# Patient Record
Sex: FEMALE | Race: WHITE | NOT HISPANIC OR LATINO | Employment: STUDENT | URBAN - NONMETROPOLITAN AREA
[De-identification: names, ages, dates, MRNs, and addresses within clinical notes are randomized per-mention and may not be internally consistent; named-entity substitution may affect disease eponyms.]

---
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
## 2024-02-01 PROCEDURE — 87637 SARSCOV2&INF A&B&RSV AMP PRB: CPT | Performed by: NURSE PRACTITIONER

## 2024-02-26 ENCOUNTER — APPOINTMENT (OUTPATIENT)
Dept: GENERAL RADIOLOGY | Facility: HOSPITAL | Age: 19
End: 2024-02-26
Payer: COMMERCIAL

## 2024-02-26 PROCEDURE — 71046 X-RAY EXAM CHEST 2 VIEWS: CPT

## 2024-03-18 ENCOUNTER — OFFICE VISIT (OUTPATIENT)
Dept: OBSTETRICS AND GYNECOLOGY | Age: 19
End: 2024-03-18
Payer: COMMERCIAL

## 2024-03-18 VITALS
DIASTOLIC BLOOD PRESSURE: 72 MMHG | SYSTOLIC BLOOD PRESSURE: 108 MMHG | HEIGHT: 66 IN | WEIGHT: 120 LBS | BODY MASS INDEX: 19.29 KG/M2

## 2024-03-18 DIAGNOSIS — Z30.9 ENCOUNTER FOR CONTRACEPTIVE MANAGEMENT, UNSPECIFIED TYPE: Primary | ICD-10-CM

## 2024-03-18 DIAGNOSIS — Z72.0 VAPES NICOTINE CONTAINING SUBSTANCE: ICD-10-CM

## 2024-03-18 NOTE — PROGRESS NOTES
"Chief Complaint  Contraception (Pt is here wanting to discuss birth control options.  Pt c/o her periods being irregular, she will go a month without having one then will start at the first of the next month.  States her periods used to always be regular and at the end of the month. )    Subjective          Angela Villarreal presents to [facility]  History of Present Illness  The patient is an 18-year-old female who presents for evaluation of multiple medical concerns.    She was on birth control pills in the beginning of 11/2023 and stopped at the beginning of 12/2023 or end of 11/2023, but it made her feel depressed.   She does not remember anything else emotionally that could have accentuated how she was feeling.   She denies any increased stress or relationship issues during that timeframe.   Her last period started on 01/05/2024.    Review of Systems   Genitourinary:  Positive for menstrual problem.   Psychiatric/Behavioral:  Negative for self-injury and suicidal ideas.         Mood changes with OCP that have estrogen and progesterone.            Objective   Vital Signs:   /72   Ht 167.6 cm (66\")   Wt 54.4 kg (120 lb)   BMI 19.37 kg/m²     Physical Exam  Neurological:      Mental Status: She is alert.   Psychiatric:         Thought Content: Thought content does not include homicidal or suicidal ideation. Thought content does not include homicidal or suicidal plan.         Result Review :   The following data was reviewed by: LANDEN Jara on 03/18/2024:  OBGYN Diagnostics Review:   HCG, Urine, QL   Date Value Ref Range Status   03/18/2024 Negative Negative Final                 No current outpatient medications on file prior to visit.     No current facility-administered medications on file prior to visit.          Assessment and Plan        We discussed different birth control options including birth control pills, patches, NuvaRing, Depo-Provera injections, " Nexplanon, and IUD.   I will send in a prescription for Slynd.   Instructed pt about beginning Slynd, side effects and S&S to report. Pt voiced understanding.      Follow-up  The patient will follow up in 3 months.    Diagnoses and all orders for this visit:    1. Encounter for contraceptive management, unspecified type (Primary)  -     POC Pregnancy, Urine    2. Vapes nicotine containing substance    Other orders  -     Drospirenone 4 MG tablet; Take 1 tablet by mouth Daily.  Dispense: 84 tablet; Refill: 0          Pediatric BMI = 20 %ile (Z= -0.83) based on CDC (Girls, 2-20 Years) BMI-for-age based on BMI available as of 3/18/2024.. BMI is within normal parameters. No other follow-up for BMI required.       Follow Up   Return in about 3 months (around 6/18/2024) for med check.    Patient was given instructions and counseling regarding her condition or for health maintenance advice. Please see specific information pulled into the AVS if appropriate.       Transcribed from ambient dictation for LANDEN Jara by Gisela Cross.  03/18/24   12:40 CDT    Patient or patient representative verbalized consent to the visit recording.  I have personally performed the services described in this document as transcribed by the above individual, and it is both accurate and complete.  LANDEN Jara  3/31/2024  21:20 CDT

## 2024-04-01 NOTE — PATIENT INSTRUCTIONS

## 2024-08-16 ENCOUNTER — APPOINTMENT (OUTPATIENT)
Dept: CT IMAGING | Age: 19
End: 2024-08-16
Payer: MEDICAID

## 2024-08-16 ENCOUNTER — HOSPITAL ENCOUNTER (EMERGENCY)
Age: 19
Discharge: HOME | End: 2024-08-16
Attending: PEDIATRICS
Payer: MEDICAID

## 2024-08-16 VITALS
DIASTOLIC BLOOD PRESSURE: 84 MMHG | HEART RATE: 97 BPM | OXYGEN SATURATION: 93 % | RESPIRATION RATE: 20 BRPM | BODY MASS INDEX: 19.29 KG/M2 | TEMPERATURE: 98.4 F | HEIGHT: 66 IN | WEIGHT: 120 LBS | SYSTOLIC BLOOD PRESSURE: 121 MMHG

## 2024-08-16 DIAGNOSIS — R51.9 ACUTE NONINTRACTABLE HEADACHE, UNSPECIFIED HEADACHE TYPE: ICD-10-CM

## 2024-08-16 DIAGNOSIS — I88.9 LYMPHADENITIS: Primary | ICD-10-CM

## 2024-08-16 LAB
ANION GAP SERPL CALCULATED.3IONS-SCNC: 11 MMOL/L (ref 7–19)
BASOPHILS # BLD: 0 K/UL (ref 0–0.2)
BASOPHILS NFR BLD: 0.8 % (ref 0–1)
BUN SERPL-MCNC: 11 MG/DL (ref 6–20)
CALCIUM SERPL-MCNC: 9.3 MG/DL (ref 8.6–10)
CHLORIDE SERPL-SCNC: 104 MMOL/L (ref 98–111)
CO2 SERPL-SCNC: 23 MMOL/L (ref 22–29)
CREAT SERPL-MCNC: 0.5 MG/DL (ref 0.5–0.9)
EOSINOPHIL # BLD: 0 K/UL (ref 0–0.6)
EOSINOPHIL NFR BLD: 0.5 % (ref 0–5)
ERYTHROCYTE [DISTWIDTH] IN BLOOD BY AUTOMATED COUNT: 14 % (ref 11.5–14.5)
GLUCOSE SERPL-MCNC: 99 MG/DL (ref 74–109)
HCT VFR BLD AUTO: 38.5 % (ref 37–47)
HGB BLD-MCNC: 12.1 G/DL (ref 12–16)
IMM GRANULOCYTES # BLD: 0 K/UL
LYMPHOCYTES # BLD: 1 K/UL (ref 1.1–4.5)
LYMPHOCYTES NFR BLD: 26.5 % (ref 20–40)
MCH RBC QN AUTO: 26.4 PG (ref 27–31)
MCHC RBC AUTO-ENTMCNC: 31.4 G/DL (ref 33–37)
MCV RBC AUTO: 83.9 FL (ref 81–99)
MONOCYTES # BLD: 0.5 K/UL (ref 0–0.9)
MONOCYTES NFR BLD: 13 % (ref 0–10)
NEUTROPHILS # BLD: 2.3 K/UL (ref 1.5–7.5)
NEUTS SEG NFR BLD: 59.2 % (ref 50–65)
PLATELET # BLD AUTO: 175 K/UL (ref 130–400)
PMV BLD AUTO: 11.4 FL (ref 9.4–12.3)
POTASSIUM SERPL-SCNC: 4.1 MMOL/L (ref 3.5–5)
RBC # BLD AUTO: 4.59 M/UL (ref 4.2–5.4)
SODIUM SERPL-SCNC: 138 MMOL/L (ref 136–145)
WBC # BLD AUTO: 3.9 K/UL (ref 4.8–10.8)

## 2024-08-16 PROCEDURE — 96374 THER/PROPH/DIAG INJ IV PUSH: CPT

## 2024-08-16 PROCEDURE — 80048 BASIC METABOLIC PNL TOTAL CA: CPT

## 2024-08-16 PROCEDURE — 36415 COLL VENOUS BLD VENIPUNCTURE: CPT

## 2024-08-16 PROCEDURE — 6360000002 HC RX W HCPCS: Performed by: PEDIATRICS

## 2024-08-16 PROCEDURE — 99284 EMERGENCY DEPT VISIT MOD MDM: CPT

## 2024-08-16 PROCEDURE — 70450 CT HEAD/BRAIN W/O DYE: CPT

## 2024-08-16 PROCEDURE — 85025 COMPLETE CBC W/AUTO DIFF WBC: CPT

## 2024-08-16 RX ORDER — KETOROLAC TROMETHAMINE 30 MG/ML
15 INJECTION, SOLUTION INTRAMUSCULAR; INTRAVENOUS ONCE
Status: COMPLETED | OUTPATIENT
Start: 2024-08-16 | End: 2024-08-16

## 2024-08-16 RX ORDER — IBUPROFEN 400 MG/1
400 TABLET ORAL EVERY 6 HOURS PRN
Qty: 20 TABLET | Refills: 0 | Status: SHIPPED | OUTPATIENT
Start: 2024-08-16

## 2024-08-16 RX ORDER — KETOROLAC TROMETHAMINE 30 MG/ML
30 INJECTION, SOLUTION INTRAMUSCULAR; INTRAVENOUS ONCE
Status: DISCONTINUED | OUTPATIENT
Start: 2024-08-16 | End: 2024-08-16

## 2024-08-16 RX ADMIN — KETOROLAC TROMETHAMINE 15 MG: 30 INJECTION, SOLUTION INTRAMUSCULAR at 21:15

## 2024-08-19 NOTE — ED PROVIDER NOTES
RADIOLOGY:  Non-plain film images such as CT, Ultrasound and MRI are read by the radiologist. Plain radiographic images are visualized and preliminarily interpreted bythe emergency physician with the below findings:          CT Head W/O Contrast   Final Result       No acute intracranial abnormality.        All CT scans are performed using dose optimization techniques as appropriate to the performed exam and include    at least one of the following: Automated exposure control, adjustment of the mA and/or kV according to size, and the use of iterative reconstruction technique.        ______________________________________    Electronically signed by: REDD JAIME M.D.   Date:     08/16/2024   Time:    21:32               LABS:  Labs Reviewed   CBC WITH AUTO DIFFERENTIAL - Abnormal; Notable for the following components:       Result Value    WBC 3.9 (*)     MCH 26.4 (*)     MCHC 31.4 (*)     Monocytes % 13.0 (*)     Lymphocytes Absolute 1.0 (*)     All other components within normal limits   BASIC METABOLIC PANEL       All other labs were within normal range or not returned as of this dictation.    EMERGENCY DEPARTMENT COURSE and DIFFERENTIAL DIAGNOSIS/MDM:   Vitals:    Vitals:    08/16/24 1857   BP: 121/84   Pulse: 97   Resp: 20   Temp: 98.4 °F (36.9 °C)   TempSrc: Oral   SpO2: 93%   Weight: 54.4 kg (120 lb)   Height: 1.676 m (5' 6\")       MDM     Amount and/or Complexity of Data Reviewed  Clinical lab tests: reviewed  Tests in the radiology section of CPT®: reviewed    18-year-old female presents to the emergency department with right-sided neck pain and lymphadenitis.  Patient has mild congestion without evidence of ear infection.  CT scan performed due to recent headaches is negative.  Lab results reviewed.  White blood cell count is 3.9 which may indicate early viral infection.  Patient received Toradol 15 mg IV in the emergency department.  Patient will follow-up with her primary care provider or [place]

## 2025-02-13 ENCOUNTER — HOSPITAL ENCOUNTER (EMERGENCY)
Facility: HOSPITAL | Age: 20
Discharge: HOME | End: 2025-02-13
Payer: COMMERCIAL

## 2025-02-13 ENCOUNTER — APPOINTMENT (OUTPATIENT)
Dept: CT IMAGING | Facility: HOSPITAL | Age: 20
End: 2025-02-13
Payer: COMMERCIAL

## 2025-02-13 VITALS
HEART RATE: 69 BPM | OXYGEN SATURATION: 98 % | WEIGHT: 123.5 LBS | HEIGHT: 66 IN | BODY MASS INDEX: 19.85 KG/M2 | RESPIRATION RATE: 18 BRPM | SYSTOLIC BLOOD PRESSURE: 120 MMHG | TEMPERATURE: 97.8 F | DIASTOLIC BLOOD PRESSURE: 66 MMHG

## 2025-02-13 DIAGNOSIS — S16.1XXA STRAIN OF NECK MUSCLE, INITIAL ENCOUNTER: Primary | ICD-10-CM

## 2025-02-13 PROCEDURE — 96372 THER/PROPH/DIAG INJ SC/IM: CPT

## 2025-02-13 PROCEDURE — 25010000002 ORPHENADRINE CITRATE PER 60 MG: Performed by: PHYSICIAN ASSISTANT

## 2025-02-13 PROCEDURE — 72125 CT NECK SPINE W/O DYE: CPT

## 2025-02-13 PROCEDURE — 99284 EMERGENCY DEPT VISIT MOD MDM: CPT

## 2025-02-13 RX ORDER — LIDOCAINE 4 G/G
1 PATCH TOPICAL
Status: DISCONTINUED | OUTPATIENT
Start: 2025-02-13 | End: 2025-02-13 | Stop reason: HOSPADM

## 2025-02-13 RX ORDER — ORPHENADRINE CITRATE 30 MG/ML
60 INJECTION INTRAMUSCULAR; INTRAVENOUS ONCE
Status: COMPLETED | OUTPATIENT
Start: 2025-02-13 | End: 2025-02-13

## 2025-02-13 RX ORDER — KETOROLAC TROMETHAMINE 10 MG/1
10 TABLET, FILM COATED ORAL ONCE
Status: COMPLETED | OUTPATIENT
Start: 2025-02-13 | End: 2025-02-13

## 2025-02-13 RX ORDER — LIDOCAINE 50 MG/G
1 PATCH TOPICAL EVERY 24 HOURS
Qty: 5 EACH | Refills: 0 | Status: SHIPPED | OUTPATIENT
Start: 2025-02-13

## 2025-02-13 RX ORDER — CYCLOBENZAPRINE HCL 5 MG
5 TABLET ORAL 3 TIMES DAILY PRN
Qty: 9 TABLET | Refills: 0 | Status: SHIPPED | OUTPATIENT
Start: 2025-02-13

## 2025-02-13 RX ADMIN — ORPHENADRINE CITRATE 60 MG: 60 INJECTION INTRAMUSCULAR; INTRAVENOUS at 18:21

## 2025-02-13 RX ADMIN — KETOROLAC TROMETHAMINE 10 MG: 10 TABLET, FILM COATED ORAL at 18:21

## 2025-02-13 RX ADMIN — LIDOCAINE 1 PATCH: 4 PATCH TOPICAL at 18:21

## 2025-02-13 NOTE — Clinical Note
[facility] EMERGENCY DEPARTMENT  [address]  [facility] [address]  Phone: [phone]    Angela Villarreal was seen and treated in our emergency department on 2/13/2025.  She may return to work on 02/14/2025.         Thank you for choosing [facility].    Evangelina Luevano PA

## 2025-02-13 NOTE — ED PROVIDER NOTES
Subjective   History of Present Illness    Patient is a pleasant 19-year-old female who presents to ED with chief complaint of neck pain.  The patient describes her symptoms began about 4 days ago without any obvious trauma.  She woke up that way.  It was sore but then her manager manipulated her neck and popped it causing worsening pain.  She denies radicular pain.  She has pain with any type of movement.  She tried ibuprofen without any relief.  She denies any loss of sensation or previous neck issues.  She denies any fever.  She describes is otherwise healthy.  She does not believe she is pregnant.    Review of Systems   Constitutional:  Negative for fever.   Musculoskeletal:  Positive for neck pain.   All other systems reviewed and are negative.      Past Medical History:   Diagnosis Date    ADHD (attention deficit hyperactivity disorder)        Allergies   Allergen Reactions    Penicillins Rash       Past Surgical History:   Procedure Laterality Date    TYMPANOSTOMY TUBE PLACEMENT         Family History   Problem Relation Age of Onset    No Known Problems Mother     No Known Problems Father     No Known Problems Sister     No Known Problems Brother        Social History     Socioeconomic History    Marital status: Single   Tobacco Use    Smoking status: Never     Passive exposure: Never    Smokeless tobacco: Never   Vaping Use    Vaping status: Some Days    Substances: Nicotine    Devices: Disposable   Substance and Sexual Activity    Alcohol use: No    Drug use: No    Sexual activity: Yes       Prior to Admission medications    Medication Sig Start Date End Date Taking? Authorizing Provider   Drospirenone 4 MG tablet Take 1 tablet by mouth Daily. 3/18/24   Ngozi Smith APRN   ibuprofen (ADVIL,MOTRIN) 400 MG tablet Take 1 tablet by mouth. 8/16/24   Provider, MD Kwame   ondansetron ODT (ZOFRAN-ODT) 8 MG disintegrating tablet Place 1 tablet on the tongue Every 8 (Eight) Hours As Needed for Nausea  "or Vomiting. 11/13/24   Kusum Mccall APRN       Medications   Lidocaine 4 % 1 patch (1 patch Transdermal Medication Applied 2/13/25 1821)   orphenadrine (NORFLEX) injection 60 mg (60 mg Intramuscular Given 2/13/25 1821)   ketorolac (TORADOL) tablet 10 mg (10 mg Oral Given 2/13/25 1821)       /78 (BP Location: Right arm, Patient Position: Sitting)   Pulse 99   Temp 98 °F (36.7 °C) (Oral)   Resp 20   Ht 167.6 cm (66\")   Wt 56 kg (123 lb 8 oz)   LMP 02/10/2025   SpO2 99%   BMI 19.93 kg/m²       Objective   Physical Exam  Vitals and nursing note reviewed.   Constitutional:       General: She is not in acute distress.     Appearance: Normal appearance. She is well-developed. She is not diaphoretic.   HENT:      Head: Normocephalic and atraumatic.      Mouth/Throat:      Mouth: Mucous membranes are moist.   Eyes:      Extraocular Movements: Extraocular movements intact.      Conjunctiva/sclera: Conjunctivae normal.      Pupils: Pupils are equal, round, and reactive to light.   Neck:      Vascular: No carotid bruit.      Trachea: Trachea and phonation normal. No tracheal deviation.     Cardiovascular:      Rate and Rhythm: Normal rate and regular rhythm.      Heart sounds: Normal heart sounds. No murmur heard.  Pulmonary:      Effort: Pulmonary effort is normal.      Breath sounds: Normal breath sounds.   Abdominal:      Palpations: Abdomen is soft.   Musculoskeletal:         General: Normal range of motion.      Cervical back: Normal range of motion and neck supple. Tenderness present. No rigidity. Pain with movement and muscular tenderness present. No spinous process tenderness.   Lymphadenopathy:      Cervical: No cervical adenopathy.   Skin:     General: Skin is warm and dry.      Capillary Refill: Capillary refill takes less than 2 seconds.   Neurological:      General: No focal deficit present.      Mental Status: She is alert and oriented to person, place, and time.      Sensory: No sensory " deficit.      Motor: No weakness.      Coordination: Coordination normal.      Deep Tendon Reflexes: Reflexes are normal and symmetric.   Psychiatric:         Mood and Affect: Mood normal.         Behavior: Behavior normal.         Thought Content: Thought content normal.         Judgment: Judgment normal.         Procedures         Lab Results (last 24 hours)       ** No results found for the last 24 hours. **            CT Cervical Spine Without Contrast    Result Date: 2/13/2025  Narrative: EXAMINATION:  CT CERVICAL SPINE WO CONTRAST-  2/13/2025 4:52 PM  HISTORY: Worsening neck pain post manipulation. No x-rays were obtained.  COMPARISON: No comparison.  DOSE LENGTH PRODUCT: 304.69 mGy.cm Automated exposure control was also utilized to decrease patient radiation dose.  TECHNIQUE: Serial helical tomographic images of the cervical spine were obtained without the use of intravenous contrast. Sagittal and coronal reformatted images were also provided.  FINDINGS:  ALIGNMENT AND ADDITIONAL FINDINGS: There is straightening of the cervical spine on the sagittal images that is felt to be positional. There is no abnormal subluxation.  BONES: There is no evidence of fracture. Vertebral body heights are maintained.  DISC SPACES:  C2-3: The disc space is maintained. There is no central spinal canal or foraminal stenosis.  C3-4: The disc space is maintained. There is no central spinal canal or foraminal stenosis.  C4-5: The disc space is maintained. There is no central spinal canal or foraminal stenosis.  C5-6: The disc space is maintained. There is no central spinal canal or foraminal stenosis.  C6-7: The disc space is maintained. There is no central spinal canal or foraminal stenosis.  C7-T1: The disc space is maintained. There is no central spinal canal or foraminal stenosis.       Impression: No evidence of acute cervical spine fracture. Straightening of the cervical spine on the sagittal images is felt to be positional.    The full report of this exam was immediately signed and available to the emergency room. The patient is currently in the emergency room.    This report was signed and finalized on 2/13/2025 6:18 PM by Dr. Calderon Crews MD.       ED Course  ED Course as of 02/13/25 1833   Thu Feb 13, 202513, 2025 1832 Patient has been educated that her CT cervical spine did not show acute cervical spine fracture.  She does have straightening of the cervical spine of the sagittal images felt to be positional.  I discussed this with the patient.  Explained diagnosis of cervical strain.  Will prescribe muscle relaxant, NSAID, and lidocaine patch.  Recommend follow-up primary care provider.  Strict return precaution advised.  Patient will be discharged in stable condition. [TK]      ED Course User Index  [TK] Evangelina Luevano PA          [facility]      Final diagnoses:   Strain of neck muscle, initial encounter       Disposition: Patient will be discharged stable condition.       Evangelina Luevano PA  02/13/25 1824       Evangelina Luevano PA  02/13/25 1833

## 2025-04-16 PROCEDURE — 87801 DETECT AGNT MULT DNA AMPLI: CPT | Performed by: NURSE PRACTITIONER

## 2025-04-16 PROCEDURE — 87591 N.GONORRHOEAE DNA AMP PROB: CPT | Performed by: NURSE PRACTITIONER

## 2025-04-16 PROCEDURE — 87661 TRICHOMONAS VAGINALIS AMPLIF: CPT | Performed by: NURSE PRACTITIONER

## 2025-04-16 PROCEDURE — 87491 CHLMYD TRACH DNA AMP PROBE: CPT | Performed by: NURSE PRACTITIONER

## 2025-04-16 PROCEDURE — 87798 DETECT AGENT NOS DNA AMP: CPT | Performed by: NURSE PRACTITIONER

## 2025-04-20 ENCOUNTER — RESULTS FOLLOW-UP (OUTPATIENT)
Dept: URGENT CARE | Facility: CLINIC | Age: 20
End: 2025-04-20
Payer: COMMERCIAL

## 2025-04-20 DIAGNOSIS — B37.31 VAGINAL CANDIDIASIS: Primary | ICD-10-CM

## 2025-04-20 RX ORDER — FLUCONAZOLE 150 MG/1
150 TABLET ORAL ONCE
Qty: 1 TABLET | Refills: 0 | Status: SHIPPED | OUTPATIENT
Start: 2025-04-20 | End: 2025-04-20